# Patient Record
(demographics unavailable — no encounter records)

---

## 2019-04-09 NOTE — EDM.PDOC
ED HPI GENERAL MEDICAL PROBLEM





- General


Chief Complaint: ENT Problem


Stated Complaint: LEFT EYE VISION LOSS


Time Seen by Provider: 04/09/19 11:31


Source of Information: Reports: Patient, RN Notes Reviewed


History Limitations: Reports: No Limitations





- History of Present Illness


INITIAL COMMENTS - FREE TEXT/NARRATIVE: 





The patient states that he developed sudden-onset severe blurry vision to his 

left eye, to the point that he can barely make out shapes, around 07:00 this 

morning, shortly after he got up. He states that he has no pain - including no 

headache and no eye pain. He denies tingling, numbness, or weakness anywhere. 

No prior similar symptoms.





The patient was seen by the Optometrist Eva Jorge this morning, who noted 

that the patient's blood pressure was elevated. She dilated both of his pupils 

and found his retinas to be normal. He has cataracts. No other ocular 

abnormalities were found. The patient was instructed to come here for further 

evaluation.





The patient does not have a PCP.











- Related Data


 Allergies











Allergy/AdvReac Type Severity Reaction Status Date / Time


 


No Known Allergies Allergy   Verified 04/09/19 11:32











Home Meds: 


 Home Meds





. [No Known Home Meds]  04/09/19 [History]











Past Medical History


HEENT History: Reports: Impaired Vision (wears glasses)


Cardiovascular History: Reports: Hypertension (untreated)


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 

excised)


Endocrine/Metabolic History: Reports: Obesity/BMI 30+





- Past Surgical History


Head Surgeries/Procedures: Reports: Other (See Below) (Excision of benign 

brainstem tumor in 2003 and 2004)


HEENT Surgical History: Reports: Oral Surgery (wisdom teeth extraction)


Musculoskeletal Surgical History: Reports: Other (See Below) (Right Achilles 

tendon repair)





Social & Family History





- Family History


Family Medical History: Noncontributory





- Tobacco Use


Smoking Status *Q: Current Every Day Smoker


Years of Tobacco use: 35


Packs/Tins Daily: 0.3





- Caffeine Use


Caffeine Use: Reports: None





- Alcohol Use


Alcohol Use History: Yes


Alcohol Use Frequency: Socially





- Recreational Drug Use


Recreational Drug Use: No





- Living Situation & Occupation


Living situation: Reports: Single, Alone


Occupation: Employed (Fills tanks)





ED ROS GENERAL





- Review of Systems


Review Of Systems: ROS reveals no pertinent complaints other than HPI.





ED EXAM GENERAL W FULL EYE





- Physical Exam


Exam: See Below


Exam Limited By: No Limitations


General Appearance: Alert, WD/WN, No Apparent Distress


Eye Exam: Bilateral Eye: EOMI


Eyelids: Bilateral: Normal Appearance


Conjunctiva & Sclera: Bilateral: Normal Appearance


Cornea Exam: Bilateral: Normal Appearance


Extraocular Movements: Bilateral: Intact


Pupils: Mydriatic (bilateral)


Pupillary Size: Bilateral: 8 mm


Anterior Chamber: Bilateral: Normal Appearance


Ears: Normal External Exam, Hearing Grossly Normal


Nose: Normal Inspection


Throat/Mouth: Normal Inspection, Normal Lips, Normal Voice, No Airway Compromise


Head: Atraumatic, Normocephalic


Neck: Normal Inspection, Full Range of Motion


Respiratory/Chest: No Respiratory Distress, Lungs Clear, Normal Breath Sounds, 

No Accessory Muscle Use


Cardiovascular: Normal Peripheral Pulses, Regular Rate, Rhythm, No Gallop, No 

JVD, No Murmur, No Rub


GI/Abdominal: Normal Bowel Sounds, Soft, Non-Tender, No Organomegaly, No 

Distention, No Abnormal Bruit, No Mass, Other (Obese)


 (Male) Exam: Deferred


Rectal (Males) Exam: Deferred


Back Exam: Normal Inspection, Full Range of Motion, NT


Extremities: Normal Inspection, Normal Range of Motion, Normal Capillary Refill

, Other (Trace to 1+ bilateral pretibial pitting edema)


Neurological: Alert, Oriented, CN II-XII Intact (except for significantly 

reduced vision in the left eye), Normal Cognition, No Motor/Sensory Deficits


Psychiatric: Normal Affect


Skin Exam: Warm, Dry, Intact, Normal Color, No Rash





Course





- Vital Signs


Last Recorded V/S: 


 Last Vital Signs











Temp  36.3 C   04/09/19 11:29


 


Pulse  93   04/09/19 11:29


 


Resp  18   04/09/19 11:29


 


BP  186/90 H  04/09/19 11:29


 


Pulse Ox  100   04/09/19 11:29














- Orders/Labs/Meds


Labs: 


 Laboratory Tests











  04/09/19 04/09/19 04/09/19 Range/Units





  12:45 12:45 12:45 


 


WBC  8.03    (4.23-9.07)  K/mm3


 


RBC  5.92    (4.63-6.08)  M/mm3


 


Hgb  17.1    (13.7-17.5)  gm/L


 


Hct  50.3    (40.1-51.0)  %


 


MCV  85.0    (79.0-92.2)  fl


 


MCH  28.9    (25.7-32.2)  pg


 


MCHC  34.0    (32.2-35.5)  g/dl


 


RDW Std Deviation  47.0 H    (35.1-43.9)  fL


 


Plt Count  332    (163-337)  K/mm3


 


MPV  9.3 L    (9.4-12.3)  fl


 


Neutrophils % (Manual)  72 H    (40-60)  %


 


Band Neutrophils %  0    (0-10)  %


 


Lymphocytes % (Manual)  18 L    (20-40)  %


 


Atypical Lymphs %  0    %


 


Monocytes % (Manual)  9    (2-10)  %


 


Eosinophils % (Manual)  1    (0.8-7.0)  %


 


Basophils % (Manual)  0 L    (0.2-1.2)  


 


Platelet Estimate  Adequate    


 


RBC Morph Comment  Normal    


 


ESR    11  (0-15)  mm/hr


 


PT     (9.5-12.1)  SECONDS


 


INR     


 


APTT     (24-31)  SECONDS


 


Sodium   140   (136-145)  mEq/L


 


Potassium   4.4   (3.5-5.1)  mEq/L


 


Chloride   104   ()  mEq/L


 


Carbon Dioxide   25   (21-32)  mEq/L


 


Anion Gap   15.4 H   (5-15)  


 


BUN   11   (7-18)  mg/dL


 


Creatinine   1.0   (0.7-1.3)  mg/dL


 


Est Cr Clr Drug Dosing   83.14   mL/min


 


Estimated GFR (MDRD)   > 60   (>60)  mL/min


 


BUN/Creatinine Ratio   11.0 L   (14-18)  


 


Glucose   110 H   ()  mg/dL


 


Calcium   9.5   (8.5-10.1)  mg/dL


 


Total Bilirubin   0.4   (0.2-1.0)  mg/dL


 


AST   24   (15-37)  U/L


 


ALT   37   (16-63)  U/L


 


Alkaline Phosphatase   86   ()  U/L


 


C-Reactive Protein   0.8   (<1.0)  mg/dL


 


Total Protein   7.8   (6.4-8.2)  g/dl


 


Albumin   3.7   (3.4-5.0)  g/dl


 


Globulin   4.1   gm/dL


 


Albumin/Globulin Ratio   0.9 L   (1-2)  














  04/09/19 Range/Units





  12:45 


 


WBC   (4.23-9.07)  K/mm3


 


RBC   (4.63-6.08)  M/mm3


 


Hgb   (13.7-17.5)  gm/L


 


Hct   (40.1-51.0)  %


 


MCV   (79.0-92.2)  fl


 


MCH   (25.7-32.2)  pg


 


MCHC   (32.2-35.5)  g/dl


 


RDW Std Deviation   (35.1-43.9)  fL


 


Plt Count   (163-337)  K/mm3


 


MPV   (9.4-12.3)  fl


 


Neutrophils % (Manual)   (40-60)  %


 


Band Neutrophils %   (0-10)  %


 


Lymphocytes % (Manual)   (20-40)  %


 


Atypical Lymphs %   %


 


Monocytes % (Manual)   (2-10)  %


 


Eosinophils % (Manual)   (0.8-7.0)  %


 


Basophils % (Manual)   (0.2-1.2)  


 


Platelet Estimate   


 


RBC Morph Comment   


 


ESR   (0-15)  mm/hr


 


PT  11.4  (9.5-12.1)  SECONDS


 


INR  1.05  


 


APTT  33 H  (24-31)  SECONDS


 


Sodium   (136-145)  mEq/L


 


Potassium   (3.5-5.1)  mEq/L


 


Chloride   ()  mEq/L


 


Carbon Dioxide   (21-32)  mEq/L


 


Anion Gap   (5-15)  


 


BUN   (7-18)  mg/dL


 


Creatinine   (0.7-1.3)  mg/dL


 


Est Cr Clr Drug Dosing   mL/min


 


Estimated GFR (MDRD)   (>60)  mL/min


 


BUN/Creatinine Ratio   (14-18)  


 


Glucose   ()  mg/dL


 


Calcium   (8.5-10.1)  mg/dL


 


Total Bilirubin   (0.2-1.0)  mg/dL


 


AST   (15-37)  U/L


 


ALT   (16-63)  U/L


 


Alkaline Phosphatase   ()  U/L


 


C-Reactive Protein   (<1.0)  mg/dL


 


Total Protein   (6.4-8.2)  g/dl


 


Albumin   (3.4-5.0)  g/dl


 


Globulin   gm/dL


 


Albumin/Globulin Ratio   (1-2)  














- Re-Assessments/Exams


Free Text/Narrative Re-Assessment/Exam: 





04/09/19 12:03


The etiology of the patient's left amaurosis fugax is unclear, but I am 

concerned about a retinal branch artery occlusion. I discussed imaging options 

with Dr. Adhikari, who recommended a MRI of the brain without contrast and a MRA 

of the head without contrast, as these are more sensitive than a CT scan. 

Happily, the MRI was available, and the patient has already been taken.





When the patient returns, I will have the nurse draw a CBC, CMP, ESR, CRP, and 

coags.








04/09/19 13:28


MR angiogram of the brain without contrast is read by Dr. Adhikari as:


1. Low signal finding within the left cerebellar hemisphere which will be 

described further on subsequent MRI brain study.


2. No abnormality is appreciated on MR angiogram study centered to the [place] 

[place].








MRI of the brain without contrast is read by Dr. Adhikari as:


1. 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


2. Minimal areas of increased signal within the right parietal periventricular 

and subcortical white matter most likely representing slight small vessel 

ischemic demyelination change.


3. No acute diffusion abnormalities are seen.








04/09/19 13:56


The CBC is unremarkable.


The CMP is unremarkable.


The CRP is within normal limits at 0.8.


The coags are unremarkable.


The ESR has not yet returned.


The MRI and MRA images have been pushed to [facility].





The patient reports no change in his condition.








04/09/19 14:04


Case discussed with Dr. Napoleon Chu, Neurologist at [facility], at 14:

01. He feels that the next step is for the patient to be evaluated by an 

Ophthalmologist (not an Optometrist).








04/09/19 14:06


The MRI and MRA images have been pushed to [facility].








04/09/19 15:32


[facility] was contacted just after 14:00, but we were told that 

they were busy and would call us back.





Case discussed with Frank at [facility] at 15:25.





Case then discussed with Dr. Mert Peña, Ophthalmologist at [facility]

, at 15:28. He suspects that the patient is suffering from a retinal artery 

occlusion, however, he stated that there is no treatment for that. It's 

possible that the patient has a retinal detachment, which will require treatment

, but not tonight. He recommended that the patient follow-up with him tomorrow, 

and stated that one of his schedulers will call our ED to make an appointment 

for the patient, in a few minutes.





The ESR has still not resulted.





Departure





- Departure


Time of Disposition: 15:35


Disposition: Home, Self-Care 01


Condition: Fair


Clinical Impression: 


 Amaurosis fugax of left eye








- Discharge Information


*PRESCRIPTION DRUG MONITORING PROGRAM REVIEWED*: Not Applicable


*COPY OF PRESCRIPTION DRUG MONITORING REPORT IN PATIENT TAMMY: Not Applicable


Referrals: 


Mert Peña MD [Ordering Only Provider] - 


Forms:  ED Department Discharge


Additional Instructions: 


You were seen in the emergency room for sudden-onset painless severe blurring 

of your left eye this morning.





Workup in the ER included a MRI of your brain, a MRA of your head, and blood 

work.





Your entire workup was unremarkable, and does not explain the cause of your 

symptoms.





Based on your history, physical examination, and ER tests, the cause of your 

vision problem is most likely due to a blood clot in your left retinal artery, 

although other causes are possible.





Your case was discussed with a Neurologist in [place], as well as Dr. Peña, an 

Ophthalmologist in [place]. No emergency treatment is necessary, however, Dr. Peña would like to see you in his office at 10:40 CENTRAL TIME tomorrow 

morning, at


[address] [place] [address]





If any other problems, please do not hesitate to return to the ER.

## 2019-04-09 NOTE — MR
MRI angiogram brain

 

Technique: Time-of-flight MR angiogram study was obtained centered to 

the [place].  Reconstructed MIP images were obtained in 

multiple projections.

 

Findings: Low signal abnormality is noted within the left cerebellar 

hemisphere.  This will be further described on subsequent MRI brain 

exam.

 

Findings: Vertebral arteries are patent distally.  Basilar artery as 

well as posterior cerebral arteries are patent.  Anterior cerebral and

 middle cerebral arteries are patent.  Common carotid arteries are 

also patent.  No aneurysm is appreciated.

 

Impression:

1.  Low signal finding within the left cerebellar hemisphere which 

will be described further on subsequent MRI brain study.

2.  No abnormality is appreciated on MR angiogram study centered to 

the [place].

 

Diagnostic code #3

## 2019-04-09 NOTE — MR
MRI brain

 

Technique: T1 sagittal; T2, T2 FLAIR, T1 and diffusion axial; T1 

coronal; T2 gradient echo coronal and axial images were also obtained 

through the brain.

 

Comparison: Previous MR angiogram study performed earlier on same day 

(12:17 PM)

 

Findings: Ventricles along with basal cisterns and sulci over the 

convexities are within normal limits for the patient's age.  Old 

infarct is identified within the left cerebellar hemisphere.  Mild 

increased signal which is noted best on the FLAIR sequence is seen 

within the right parietal region involving the right periventricular 

white matter and within the subcortical white matter.  No other 

abnormal signal is seen within the brain parenchyma.  No midline shift

 or mass effect is seen.  Normal signal void is seen within the major 

cerebral arteries within the skull base.  No acute diffusion 

abnormalities are seen.

 

Impression:

1.  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

2.  Minimal areas of increased signal within the right parietal 

periventricular and subcortical white matter most likely representing 

slight small vessel ischemic demyelination change.

3.  No acute diffusion abnormalities are seen.

 

Diagnostic code #3